# Patient Record
Sex: FEMALE | Race: WHITE | NOT HISPANIC OR LATINO | Employment: FULL TIME | URBAN - NONMETROPOLITAN AREA
[De-identification: names, ages, dates, MRNs, and addresses within clinical notes are randomized per-mention and may not be internally consistent; named-entity substitution may affect disease eponyms.]

---

## 2023-10-13 PROBLEM — E78.5 HYPERLIPEMIA: Status: ACTIVE | Noted: 2023-10-13

## 2023-10-13 PROBLEM — E03.9 COMPENSATED HYPOTHYROIDISM: Status: ACTIVE | Noted: 2023-10-13

## 2023-10-13 PROBLEM — G43.909 MIGRAINE, UNSPECIFIED, NOT INTRACTABLE, WITHOUT STATUS MIGRAINOSUS: Status: ACTIVE | Noted: 2023-10-13

## 2023-12-08 ENCOUNTER — OFFICE VISIT (OUTPATIENT)
Dept: PRIMARY CARE | Facility: CLINIC | Age: 43
End: 2023-12-08
Payer: COMMERCIAL

## 2023-12-08 VITALS
HEART RATE: 82 BPM | OXYGEN SATURATION: 95 % | HEIGHT: 64 IN | SYSTOLIC BLOOD PRESSURE: 122 MMHG | BODY MASS INDEX: 27.54 KG/M2 | DIASTOLIC BLOOD PRESSURE: 82 MMHG | WEIGHT: 161.3 LBS

## 2023-12-08 DIAGNOSIS — E78.2 MIXED HYPERLIPIDEMIA: Primary | ICD-10-CM

## 2023-12-08 DIAGNOSIS — E03.9 COMPENSATED HYPOTHYROIDISM: ICD-10-CM

## 2023-12-08 PROCEDURE — 99214 OFFICE O/P EST MOD 30 MIN: CPT | Performed by: FAMILY MEDICINE

## 2023-12-08 PROCEDURE — 1036F TOBACCO NON-USER: CPT | Performed by: FAMILY MEDICINE

## 2023-12-08 RX ORDER — ATORVASTATIN CALCIUM 40 MG/1
40 TABLET, FILM COATED ORAL
COMMUNITY
Start: 2023-07-31 | End: 2023-12-08 | Stop reason: SDUPTHER

## 2023-12-08 RX ORDER — ATORVASTATIN CALCIUM 40 MG/1
40 TABLET, FILM COATED ORAL DAILY
Qty: 90 TABLET | Refills: 3 | Status: SHIPPED | OUTPATIENT
Start: 2023-12-08 | End: 2024-12-07

## 2023-12-08 NOTE — PROGRESS NOTES
"Subjective   Patient ID: Linh Rivera is a 43 y.o. female who presents for Annual Exam.    HPI   Since the last office visit there have been no interval operations, hospitalizations, important illnesses or injuries.  Hyperlipidemia- is on a statin and a prudent diet.  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 Appears stated age  Eye:  Pupils are equal, round and reactive to light, Extraocular movements are intact, Normal conjunctiva.    Neck:  Supple, Non-tender, No carotid bruit, No jugular venous distention, No lymphadenopathy, No thyromegaly.    Respiratory:  Lungs are clear to auscultation, Respirations are non-labored, Breath sounds are equal.    Cardiovascular:  Normal rate, Regular rhythm, No murmur.    Gastrointestinal:  Soft, Non-tender, No organomegaly. No solid or pulsatile mass  Integumentary:  Warm, Dry. No concerning lesions on exposed areas  Neurologic:  Alert, Oriented.  Gross and fine motor intact, CN 2-12 intact  Psychiatric:  Cooperative, Appropriate mood & affect.  Assessment/Plan   Problem List Items Addressed This Visit             ICD-10-CM    Compensated hypothyroidism E03.9    Relevant Orders    Thyroid Stimulating Hormone    Follow Up In Primary Care - Established    Hyperlipemia - Primary E78.5    Relevant Medications    atorvastatin (Lipitor) 40 mg tablet    Other Relevant Orders "    CBC    Comprehensive Metabolic Panel    Lipid Panel    Follow Up In Primary Care - Established

## 2024-12-04 ENCOUNTER — LAB (OUTPATIENT)
Facility: LAB | Age: 44
End: 2024-12-04
Payer: COMMERCIAL

## 2024-12-04 DIAGNOSIS — E03.9 COMPENSATED HYPOTHYROIDISM: ICD-10-CM

## 2024-12-04 DIAGNOSIS — E78.2 MIXED HYPERLIPIDEMIA: ICD-10-CM

## 2024-12-04 LAB
ALBUMIN SERPL BCP-MCNC: 4.4 G/DL (ref 3.4–5)
ALP SERPL-CCNC: 43 U/L (ref 33–110)
ALT SERPL W P-5'-P-CCNC: 28 U/L (ref 7–45)
ANION GAP SERPL CALC-SCNC: 8 MMOL/L (ref 10–20)
AST SERPL W P-5'-P-CCNC: 20 U/L (ref 9–39)
BILIRUB SERPL-MCNC: 0.6 MG/DL (ref 0–1.2)
BUN SERPL-MCNC: 18 MG/DL (ref 6–23)
CALCIUM SERPL-MCNC: 9.2 MG/DL (ref 8.6–10.3)
CHLORIDE SERPL-SCNC: 105 MMOL/L (ref 98–107)
CHOLEST SERPL-MCNC: 157 MG/DL (ref 0–199)
CHOLESTEROL/HDL RATIO: 2.5
CO2 SERPL-SCNC: 28 MMOL/L (ref 21–32)
CREAT SERPL-MCNC: 0.78 MG/DL (ref 0.5–1.05)
EGFRCR SERPLBLD CKD-EPI 2021: >90 ML/MIN/1.73M*2
ERYTHROCYTE [DISTWIDTH] IN BLOOD BY AUTOMATED COUNT: 12.6 % (ref 11.5–14.5)
GLUCOSE SERPL-MCNC: 93 MG/DL (ref 74–99)
HCT VFR BLD AUTO: 42.9 % (ref 36–46)
HDLC SERPL-MCNC: 63 MG/DL
HGB BLD-MCNC: 14.4 G/DL (ref 12–16)
LDLC SERPL CALC-MCNC: 74 MG/DL
MCH RBC QN AUTO: 31.2 PG (ref 26–34)
MCHC RBC AUTO-ENTMCNC: 33.6 G/DL (ref 32–36)
MCV RBC AUTO: 93 FL (ref 80–100)
NON HDL CHOLESTEROL: 94 MG/DL (ref 0–149)
NRBC BLD-RTO: 0 /100 WBCS (ref 0–0)
PLATELET # BLD AUTO: 292 X10*3/UL (ref 150–450)
POTASSIUM SERPL-SCNC: 4.3 MMOL/L (ref 3.5–5.3)
PROT SERPL-MCNC: 6.5 G/DL (ref 6.4–8.2)
RBC # BLD AUTO: 4.61 X10*6/UL (ref 4–5.2)
SODIUM SERPL-SCNC: 137 MMOL/L (ref 136–145)
TRIGL SERPL-MCNC: 99 MG/DL (ref 0–149)
TSH SERPL-ACNC: 3.23 MIU/L (ref 0.44–3.98)
VLDL: 20 MG/DL (ref 0–40)
WBC # BLD AUTO: 5.3 X10*3/UL (ref 4.4–11.3)

## 2024-12-04 PROCEDURE — 85027 COMPLETE CBC AUTOMATED: CPT

## 2024-12-04 PROCEDURE — 84443 ASSAY THYROID STIM HORMONE: CPT

## 2024-12-04 PROCEDURE — 36415 COLL VENOUS BLD VENIPUNCTURE: CPT

## 2024-12-04 PROCEDURE — 80053 COMPREHEN METABOLIC PANEL: CPT

## 2024-12-04 PROCEDURE — 80061 LIPID PANEL: CPT

## 2024-12-10 ENCOUNTER — APPOINTMENT (OUTPATIENT)
Dept: PRIMARY CARE | Facility: CLINIC | Age: 44
End: 2024-12-10
Payer: COMMERCIAL

## 2024-12-10 VITALS
OXYGEN SATURATION: 98 % | HEART RATE: 90 BPM | SYSTOLIC BLOOD PRESSURE: 126 MMHG | HEIGHT: 64 IN | DIASTOLIC BLOOD PRESSURE: 82 MMHG | BODY MASS INDEX: 26.22 KG/M2 | WEIGHT: 153.6 LBS

## 2024-12-10 DIAGNOSIS — E78.2 MIXED HYPERLIPIDEMIA: ICD-10-CM

## 2024-12-10 DIAGNOSIS — E03.9 COMPENSATED HYPOTHYROIDISM: ICD-10-CM

## 2024-12-10 PROCEDURE — 1036F TOBACCO NON-USER: CPT | Performed by: FAMILY MEDICINE

## 2024-12-10 PROCEDURE — 3008F BODY MASS INDEX DOCD: CPT | Performed by: FAMILY MEDICINE

## 2024-12-10 PROCEDURE — 99214 OFFICE O/P EST MOD 30 MIN: CPT | Performed by: FAMILY MEDICINE

## 2024-12-10 RX ORDER — ATORVASTATIN CALCIUM 40 MG/1
40 TABLET, FILM COATED ORAL DAILY
Qty: 90 TABLET | Refills: 3 | Status: SHIPPED | OUTPATIENT
Start: 2024-12-10 | End: 2025-12-10

## 2024-12-10 NOTE — PROGRESS NOTES
"Subjective   Patient ID: Linh Rivera is a 44 y.o. female who presents for Annual Exam (Rev labs).    HPI   Since the last office visit there have been no interval operations, hospitalizations, important illnesses or injuries.  Tob-0, alc-rarre, no illicit,reg exercise.  Sees [facility]  Pat gf woth colon cancer and p ggm at 97. Start CRC screen at 46  Review of Systems  General-no fatigue weight to within 10 pounds  ENT no problems with vision swallowing  Cardiac no chest pains palpitations change in exercise tolerance or capacity  Pulmonary no cough shortness of breath  GI no heartburn or abdominal pain  Musculoskeletal no joint pains    Objective   /82   Pulse 90   Ht 1.626 m (5' 4\")   Wt 69.7 kg (153 lb 9.6 oz)   SpO2 98%   BMI 26.37 kg/m²     Physical Exam  General:  Alert, No acute distress. Appears stated age  Eye:  Pupils are equal, round and reactive to light, Extraocular movements are intact, Normal conjunctiva.    Neck:  Supple, Non-tender, No carotid bruit, No jugular venous distention, No lymphadenopathy, No thyromegaly.    Respiratory:  Lungs are clear to auscultation, Respirations are non-labored, Breath sounds are equal.    Cardiovascular:  Normal rate, Regular rhythm, No murmur.    Gastrointestinal:  Soft, Non-tender, No organomegaly. No solid or pulsatile mass  Integumentary:  Warm, Dry. No concerning lesions on exposed areas  Neurologic:  Alert, Oriented.  Gross and fine motor intact, CN 2-12 intact  Psychiatric:  Cooperative, Appropriate mood & affect.    Assessment/Plan   Problem List Items Addressed This Visit             ICD-10-CM    Compensated hypothyroidism E03.9    Hyperlipemia E78.5          "

## 2025-01-23 ENCOUNTER — TELEPHONE (OUTPATIENT)
Age: 45
End: 2025-01-23
Payer: COMMERCIAL

## 2025-01-23 NOTE — TELEPHONE ENCOUNTER
Patient called stating that her visit on 12/10/24 was billed incorrectly.  Can you take a look.  She is stating it should have been a preventative visit.    Thanks.  Pam

## 2025-12-18 ENCOUNTER — APPOINTMENT (OUTPATIENT)
Age: 45
End: 2025-12-18
Payer: COMMERCIAL